# Patient Record
Sex: FEMALE | Race: WHITE | NOT HISPANIC OR LATINO | Employment: OTHER | URBAN - METROPOLITAN AREA
[De-identification: names, ages, dates, MRNs, and addresses within clinical notes are randomized per-mention and may not be internally consistent; named-entity substitution may affect disease eponyms.]

---

## 2017-08-08 ENCOUNTER — ANESTHESIA (OUTPATIENT)
Dept: SURGERY | Facility: CLINIC | Age: 72
End: 2017-08-08
Payer: MEDICARE

## 2017-08-08 ENCOUNTER — SURGERY (OUTPATIENT)
Age: 72
End: 2017-08-08

## 2017-08-08 ENCOUNTER — HOSPITAL ENCOUNTER (OUTPATIENT)
Facility: CLINIC | Age: 72
Discharge: HOME | End: 2017-08-08
Attending: PLASTIC SURGERY | Admitting: PLASTIC SURGERY
Payer: MEDICARE

## 2017-08-08 ENCOUNTER — ANESTHESIA EVENT (OUTPATIENT)
Dept: SURGERY | Facility: CLINIC | Age: 72
End: 2017-08-08
Payer: MEDICARE

## 2017-08-08 VITALS
WEIGHT: 113 LBS | DIASTOLIC BLOOD PRESSURE: 84 MMHG | BODY MASS INDEX: 22.19 KG/M2 | RESPIRATION RATE: 16 BRPM | SYSTOLIC BLOOD PRESSURE: 138 MMHG | OXYGEN SATURATION: 96 % | HEIGHT: 60 IN | TEMPERATURE: 97.2 F

## 2017-08-08 DIAGNOSIS — C50.912 MALIGNANT NEOPLASM OF LEFT FEMALE BREAST, UNSPECIFIED ESTROGEN RECEPTOR STATUS, UNSPECIFIED SITE OF BREAST (H): Primary | ICD-10-CM

## 2017-08-08 PROCEDURE — A9270 NON-COVERED ITEM OR SERVICE: HCPCS | Mod: GY | Performed by: PLASTIC SURGERY

## 2017-08-08 PROCEDURE — 27210794 ZZH OR GENERAL SUPPLY STERILE: Performed by: PLASTIC SURGERY

## 2017-08-08 PROCEDURE — L8600 IMPLANT BREAST SILICONE/EQ: HCPCS | Performed by: PLASTIC SURGERY

## 2017-08-08 PROCEDURE — 25000128 H RX IP 250 OP 636: Performed by: NURSE ANESTHETIST, CERTIFIED REGISTERED

## 2017-08-08 PROCEDURE — 88305 TISSUE EXAM BY PATHOLOGIST: CPT | Performed by: PLASTIC SURGERY

## 2017-08-08 PROCEDURE — 37000008 ZZH ANESTHESIA TECHNICAL FEE, 1ST 30 MIN: Performed by: PLASTIC SURGERY

## 2017-08-08 PROCEDURE — 36000056 ZZH SURGERY LEVEL 3 1ST 30 MIN: Performed by: PLASTIC SURGERY

## 2017-08-08 PROCEDURE — 25000132 ZZH RX MED GY IP 250 OP 250 PS 637: Mod: GY | Performed by: PLASTIC SURGERY

## 2017-08-08 PROCEDURE — 40000170 ZZH STATISTIC PRE-PROCEDURE ASSESSMENT II: Performed by: PLASTIC SURGERY

## 2017-08-08 PROCEDURE — 37000009 ZZH ANESTHESIA TECHNICAL FEE, EACH ADDTL 15 MIN: Performed by: PLASTIC SURGERY

## 2017-08-08 PROCEDURE — 36000058 ZZH SURGERY LEVEL 3 EA 15 ADDTL MIN: Performed by: PLASTIC SURGERY

## 2017-08-08 PROCEDURE — 71000013 ZZH RECOVERY PHASE 1 LEVEL 1 EA ADDTL HR: Performed by: PLASTIC SURGERY

## 2017-08-08 PROCEDURE — 25000128 H RX IP 250 OP 636: Performed by: ANESTHESIOLOGY

## 2017-08-08 PROCEDURE — 25000125 ZZHC RX 250: Performed by: NURSE ANESTHETIST, CERTIFIED REGISTERED

## 2017-08-08 PROCEDURE — 88300 SURGICAL PATH GROSS: CPT | Mod: 26 | Performed by: PLASTIC SURGERY

## 2017-08-08 PROCEDURE — 25000128 H RX IP 250 OP 636: Performed by: PLASTIC SURGERY

## 2017-08-08 PROCEDURE — 27210995 ZZH RX 272: Performed by: PLASTIC SURGERY

## 2017-08-08 PROCEDURE — 88300 SURGICAL PATH GROSS: CPT | Performed by: PLASTIC SURGERY

## 2017-08-08 PROCEDURE — 71000012 ZZH RECOVERY PHASE 1 LEVEL 1 FIRST HR: Performed by: PLASTIC SURGERY

## 2017-08-08 PROCEDURE — 88305 TISSUE EXAM BY PATHOLOGIST: CPT | Mod: 26 | Performed by: PLASTIC SURGERY

## 2017-08-08 PROCEDURE — 71000027 ZZH RECOVERY PHASE 2 EACH 15 MINS: Performed by: PLASTIC SURGERY

## 2017-08-08 PROCEDURE — 25000125 ZZHC RX 250: Performed by: PLASTIC SURGERY

## 2017-08-08 DEVICE — IMPLANTABLE DEVICE: Type: IMPLANTABLE DEVICE | Site: BREAST | Status: FUNCTIONAL

## 2017-08-08 RX ORDER — SODIUM CHLORIDE, SODIUM LACTATE, POTASSIUM CHLORIDE, CALCIUM CHLORIDE 600; 310; 30; 20 MG/100ML; MG/100ML; MG/100ML; MG/100ML
INJECTION, SOLUTION INTRAVENOUS CONTINUOUS
Status: DISCONTINUED | OUTPATIENT
Start: 2017-08-08 | End: 2017-08-08 | Stop reason: HOSPADM

## 2017-08-08 RX ORDER — ONDANSETRON 2 MG/ML
4 INJECTION INTRAMUSCULAR; INTRAVENOUS EVERY 30 MIN PRN
Status: DISCONTINUED | OUTPATIENT
Start: 2017-08-08 | End: 2017-08-08 | Stop reason: HOSPADM

## 2017-08-08 RX ORDER — ACYCLOVIR 200 MG/1
CAPSULE ORAL
Status: DISCONTINUED
Start: 2017-08-08 | End: 2017-08-08 | Stop reason: HOSPADM

## 2017-08-08 RX ORDER — CEFAZOLIN SODIUM 1 G/3ML
INJECTION, POWDER, FOR SOLUTION INTRAMUSCULAR; INTRAVENOUS
Status: DISCONTINUED
Start: 2017-08-08 | End: 2017-08-08 | Stop reason: HOSPADM

## 2017-08-08 RX ORDER — LIDOCAINE HYDROCHLORIDE 20 MG/ML
INJECTION, SOLUTION INFILTRATION; PERINEURAL PRN
Status: DISCONTINUED | OUTPATIENT
Start: 2017-08-08 | End: 2017-08-08

## 2017-08-08 RX ORDER — PROPOFOL 10 MG/ML
INJECTION, EMULSION INTRAVENOUS PRN
Status: DISCONTINUED | OUTPATIENT
Start: 2017-08-08 | End: 2017-08-08

## 2017-08-08 RX ORDER — HYDROCODONE BITARTRATE AND ACETAMINOPHEN 5; 325 MG/1; MG/1
1 TABLET ORAL
Status: COMPLETED | OUTPATIENT
Start: 2017-08-08 | End: 2017-08-08

## 2017-08-08 RX ORDER — MEPERIDINE HYDROCHLORIDE 25 MG/ML
12.5 INJECTION INTRAMUSCULAR; INTRAVENOUS; SUBCUTANEOUS
Status: DISCONTINUED | OUTPATIENT
Start: 2017-08-08 | End: 2017-08-08 | Stop reason: HOSPADM

## 2017-08-08 RX ORDER — FENTANYL CITRATE 50 UG/ML
INJECTION, SOLUTION INTRAMUSCULAR; INTRAVENOUS PRN
Status: DISCONTINUED | OUTPATIENT
Start: 2017-08-08 | End: 2017-08-08

## 2017-08-08 RX ORDER — NALOXONE HYDROCHLORIDE 0.4 MG/ML
.1-.4 INJECTION, SOLUTION INTRAMUSCULAR; INTRAVENOUS; SUBCUTANEOUS
Status: DISCONTINUED | OUTPATIENT
Start: 2017-08-08 | End: 2017-08-08 | Stop reason: HOSPADM

## 2017-08-08 RX ORDER — FENTANYL CITRATE 50 UG/ML
25-50 INJECTION, SOLUTION INTRAMUSCULAR; INTRAVENOUS
Status: DISCONTINUED | OUTPATIENT
Start: 2017-08-08 | End: 2017-08-08 | Stop reason: HOSPADM

## 2017-08-08 RX ORDER — BIOTIN 10000 MCG
CAPSULE ORAL DAILY
COMMUNITY

## 2017-08-08 RX ORDER — CEFAZOLIN SODIUM 2 G/100ML
2 INJECTION, SOLUTION INTRAVENOUS
Status: COMPLETED | OUTPATIENT
Start: 2017-08-08 | End: 2017-08-08

## 2017-08-08 RX ORDER — SODIUM CHLORIDE, SODIUM LACTATE, POTASSIUM CHLORIDE, CALCIUM CHLORIDE 600; 310; 30; 20 MG/100ML; MG/100ML; MG/100ML; MG/100ML
INJECTION, SOLUTION INTRAVENOUS CONTINUOUS PRN
Status: DISCONTINUED | OUTPATIENT
Start: 2017-08-08 | End: 2017-08-08

## 2017-08-08 RX ORDER — FENTANYL CITRATE 50 UG/ML
25-50 INJECTION, SOLUTION INTRAMUSCULAR; INTRAVENOUS EVERY 5 MIN PRN
Status: DISCONTINUED | OUTPATIENT
Start: 2017-08-08 | End: 2017-08-08 | Stop reason: HOSPADM

## 2017-08-08 RX ORDER — DEXAMETHASONE SODIUM PHOSPHATE 4 MG/ML
INJECTION, SOLUTION INTRA-ARTICULAR; INTRALESIONAL; INTRAMUSCULAR; INTRAVENOUS; SOFT TISSUE PRN
Status: DISCONTINUED | OUTPATIENT
Start: 2017-08-08 | End: 2017-08-08

## 2017-08-08 RX ORDER — VENLAFAXINE HYDROCHLORIDE 75 MG/1
CAPSULE, EXTENDED RELEASE ORAL DAILY
COMMUNITY

## 2017-08-08 RX ORDER — LIDOCAINE HYDROCHLORIDE 10 MG/ML
INJECTION, SOLUTION INFILTRATION; PERINEURAL
Status: DISCONTINUED
Start: 2017-08-08 | End: 2017-08-08 | Stop reason: HOSPADM

## 2017-08-08 RX ORDER — ONDANSETRON 2 MG/ML
INJECTION INTRAMUSCULAR; INTRAVENOUS PRN
Status: DISCONTINUED | OUTPATIENT
Start: 2017-08-08 | End: 2017-08-08

## 2017-08-08 RX ORDER — ONDANSETRON 4 MG/1
4 TABLET, ORALLY DISINTEGRATING ORAL EVERY 30 MIN PRN
Status: DISCONTINUED | OUTPATIENT
Start: 2017-08-08 | End: 2017-08-08 | Stop reason: HOSPADM

## 2017-08-08 RX ORDER — BUPIVACAINE HYDROCHLORIDE AND EPINEPHRINE 5; 5 MG/ML; UG/ML
INJECTION, SOLUTION PERINEURAL PRN
Status: DISCONTINUED | OUTPATIENT
Start: 2017-08-08 | End: 2017-08-08 | Stop reason: HOSPADM

## 2017-08-08 RX ORDER — PROPOFOL 10 MG/ML
INJECTION, EMULSION INTRAVENOUS CONTINUOUS PRN
Status: DISCONTINUED | OUTPATIENT
Start: 2017-08-08 | End: 2017-08-08

## 2017-08-08 RX ORDER — CEFAZOLIN SODIUM 1 G/3ML
1 INJECTION, POWDER, FOR SOLUTION INTRAMUSCULAR; INTRAVENOUS SEE ADMIN INSTRUCTIONS
Status: DISCONTINUED | OUTPATIENT
Start: 2017-08-08 | End: 2017-08-08 | Stop reason: HOSPADM

## 2017-08-08 RX ORDER — HYDROCODONE BITARTRATE AND ACETAMINOPHEN 5; 325 MG/1; MG/1
1-2 TABLET ORAL EVERY 4 HOURS PRN
Qty: 40 TABLET | Refills: 0 | Status: SHIPPED | OUTPATIENT
Start: 2017-08-08

## 2017-08-08 RX ORDER — EPHEDRINE SULFATE 50 MG/ML
INJECTION, SOLUTION INTRAMUSCULAR; INTRAVENOUS; SUBCUTANEOUS PRN
Status: DISCONTINUED | OUTPATIENT
Start: 2017-08-08 | End: 2017-08-08

## 2017-08-08 RX ADMIN — SODIUM CHLORIDE, POTASSIUM CHLORIDE, SODIUM LACTATE AND CALCIUM CHLORIDE: 600; 310; 30; 20 INJECTION, SOLUTION INTRAVENOUS at 12:59

## 2017-08-08 RX ADMIN — BUPIVACAINE HYDROCHLORIDE AND EPINEPHRINE BITARTRATE 20 ML: 5; .005 INJECTION, SOLUTION PERINEURAL at 11:54

## 2017-08-08 RX ADMIN — SODIUM CHLORIDE, POTASSIUM CHLORIDE, SODIUM LACTATE AND CALCIUM CHLORIDE: 600; 310; 30; 20 INJECTION, SOLUTION INTRAVENOUS at 11:24

## 2017-08-08 RX ADMIN — LIDOCAINE HYDROCHLORIDE 20 MG: 20 INJECTION, SOLUTION INFILTRATION; PERINEURAL at 11:25

## 2017-08-08 RX ADMIN — CEFAZOLIN SODIUM 2 G: 2 INJECTION, SOLUTION INTRAVENOUS at 11:30

## 2017-08-08 RX ADMIN — Medication 5 MG: at 12:31

## 2017-08-08 RX ADMIN — FENTANYL CITRATE 50 MCG: 50 INJECTION, SOLUTION INTRAMUSCULAR; INTRAVENOUS at 11:24

## 2017-08-08 RX ADMIN — CEFAZOLIN 500 ML GIVEN: 1 INJECTION, POWDER, FOR SOLUTION INTRAMUSCULAR; INTRAVENOUS at 12:29

## 2017-08-08 RX ADMIN — DEXAMETHASONE SODIUM PHOSPHATE 4 MG: 4 INJECTION, SOLUTION INTRA-ARTICULAR; INTRALESIONAL; INTRAMUSCULAR; INTRAVENOUS; SOFT TISSUE at 11:30

## 2017-08-08 RX ADMIN — PROPOFOL 160 MG: 10 INJECTION, EMULSION INTRAVENOUS at 11:25

## 2017-08-08 RX ADMIN — HYDROCODONE BITARTRATE AND ACETAMINOPHEN 1 TABLET: 5; 325 TABLET ORAL at 14:10

## 2017-08-08 RX ADMIN — FENTANYL CITRATE 25 MCG: 50 INJECTION, SOLUTION INTRAMUSCULAR; INTRAVENOUS at 12:58

## 2017-08-08 RX ADMIN — EPINEPHRINE 445 ML: 1 INJECTION INTRAMUSCULAR; INTRAVENOUS; SUBCUTANEOUS at 11:49

## 2017-08-08 RX ADMIN — ONDANSETRON 4 MG: 2 INJECTION INTRAMUSCULAR; INTRAVENOUS at 12:53

## 2017-08-08 RX ADMIN — Medication 5 MG: at 12:11

## 2017-08-08 RX ADMIN — FENTANYL CITRATE 25 MCG: 50 INJECTION, SOLUTION INTRAMUSCULAR; INTRAVENOUS at 11:50

## 2017-08-08 RX ADMIN — PHENYLEPHRINE HYDROCHLORIDE 50 MCG: 10 INJECTION, SOLUTION INTRAMUSCULAR; INTRAVENOUS; SUBCUTANEOUS at 12:37

## 2017-08-08 RX ADMIN — PROPOFOL 200 MCG/KG/MIN: 10 INJECTION, EMULSION INTRAVENOUS at 11:25

## 2017-08-08 RX ADMIN — HYDROMORPHONE HYDROCHLORIDE 0.3 MG: 1 INJECTION, SOLUTION INTRAMUSCULAR; INTRAVENOUS; SUBCUTANEOUS at 14:07

## 2017-08-08 RX ADMIN — MIDAZOLAM HYDROCHLORIDE 2 MG: 1 INJECTION, SOLUTION INTRAMUSCULAR; INTRAVENOUS at 11:23

## 2017-08-08 NOTE — IP AVS SNAPSHOT
MRN:[id number]                      After Visit Summary   8/8/2017    Marysol Kearns    MRN: [id number]           Thank you!     Thank you for choosing [facility] for your care. Our goal is always to provide you with excellent care. Hearing back from our patients is one way we can continue to improve our services. Please take a few minutes to complete the written survey that you may receive in the mail after you visit with us. Thank you!        Patient Information     Date Of Birth          1945        About your hospital stay     You were admitted on:  August 8, 2017 You last received care in the:  [facility]    You were discharged on:  August 8, 2017       Who to Call     For medical emergencies, please call 911.  For non-urgent questions about your medical care, please call your primary care provider or clinic, [phone]  For questions related to your surgery, please call your surgery clinic        Attending Provider     Provider Ken Winkler MD Plastic Surgery       Primary Care Provider Office Phone # Fax #    Luna Wick [phone]      Further instructions from your care team       Next week Wednesday w/ Dr. Willoughby.  Call [phone].  No heavy lifting or strenuous activity.  10 lb lifting limit.  Wear sports bra.  May shower and remove dressing tomorrow.      Same Day Surgery Discharge Instructions for  Sedation and General Anesthesia       It's not unusual to feel dizzy, light-headed or faint for up to 24 hours after surgery or while taking pain medication.  If you have these symptoms: sit for a few minutes before standing and have someone assist you when you get up to walk or use the bathroom.      You should rest and relax for the next 24 hours. We recommend you make arrangements to have an adult stay with you for at least 24 hours after your discharge.  Avoid hazardous and strenuous activity.      DO NOT  DRIVE any vehicle or operate mechanical equipment for 24 hours following the end of your surgery.  Even though you may feel normal, your reactions may be affected by the medication you have received.      Do not drink alcoholic beverages for 24 hours following surgery.       Slowly progress to your regular diet as you feel able. It's not unusual to feel nauseated and/or vomit after receiving anesthesia.  If you develop these symptoms, drink clear liquids (apple juice, ginger ale, broth, 7-up, etc. ) until you feel better.  If your nausea and vomiting persists for 24 hours, please notify your surgeon.        All narcotic pain medications, along with inactivity and anesthesia, can cause constipation. Drinking plenty of liquids and increasing fiber intake will help.      For any questions of a medical nature, call your surgeon.      Do not make important decisions for 24 hours.      If you had general anesthesia, you may have a sore throat for a couple of days related to the breathing tube used during surgery.  You may use Cepacol lozenges to help with this discomfort.  If it worsens or if you develop a fever, contact your surgeon.       If you feel your pain is not well managed with the pain medications prescribed by your surgeon, please contact your surgeon's office to let them know so they can address your concerns.       Discharge Instructions following Breast Surgery  [facility]    Diet:   Resume diet as tolerated.  Drink plenty of fluids to prevent constipation.    Activity:   Gentle rotation & stretching of your arms/shoulders will prevent stiffness in joints   Increase activity gradually   No heavy lifting greater than 10-15 pounds & no strenuous activity until  approved by surgeon    Bathing/Incision Care:   You may shower as directed by surgeon   Pat incisions dry.  No lotions, powders or perfumes to incisions   Tape dressings (steri strips) will fall off in 7-10 days (if  "present)    What to expect:   A tingly or itchy sensation around the incision is a normal sign of healing   Some clear, pink drainage from incisions is normal.      Notify your surgeon for the following signs & symptoms:   Redness, warmth, or swelling of the incision    Foul smelling or increased drainage   Chills or temperature greater than 101 F   Pain not controlled by pain medications      **If you have questions or concerns about your procedure,   call Dr Willoughby at [phone]**          Pending Results     No orders found from 2017 to 2017.            Admission Information     Date & Time Provider Department Dept. Phone    2017 Ken Willoughby MD [facility] [phone]      Your Vitals Were     Blood Pressure Temperature Respirations Height Weight Pulse Oximetry    151/84 96.8  F (36  C) 14 1.524 m (5') 51.3 kg (113 lb) 97%    BMI (Body Mass Index)                   22.07 kg/m2           [employer] Information     [employer] lets you send messages to your doctor, view your test results, renew your prescriptions, schedule appointments and more. To sign up, go to [email or web address] . Click on \"Log in\" on the left side of the screen, which will take you to the Welcome page. Then click on \"Sign up Now\" on the right side of the page.     You will be asked to enter the access code listed below, as well as some personal information. Please follow the directions to create your username and password.     Your access code is: [email or web address]  Expires: 2017  1:35 PM     Your access code will  in 90 days. If you need help or a new code, please call your [place] clinic or [phone].        Care EveryWhere ID     This is your Care EveryWhere ID. This could be used by other organizations to access your [place] medical records  [id number]        Equal Access to Services     ANDREW SHAW AH: John Paul Aragon, olman oakes, bandar goldman, " harvinder ramosaudrey jose'aan ah. So [facility] [phone].    ATENCIÓN: Si miguel forbes, tiene a bruce disposición servicios gratuitos de asistencia lingüística. Isael elizalde [phone].    We comply with applicable federal civil rights laws and [place] laws. We do not discriminate on the basis of race, color, national origin, age, disability sex, sexual orientation or gender identity.               Review of your medicines      START taking        Dose / Directions    HYDROcodone-acetaminophen 5-325 MG per tablet   Commonly known as:  NORCO   Used for:  Malignant neoplasm of left female breast, unspecified estrogen receptor status, unspecified site of breast (H)   Notes to Patient:  ONE TABLET TAKEN AT 2:10 P.M.        Dose:  1-2 tablet   Take 1-2 tablets by mouth every 4 hours as needed for moderate to severe pain   Quantity:  40 tablet   Refills:  0         CONTINUE these medicines which have NOT CHANGED        Dose / Directions    Biotin 10 MG Caps        Take by mouth daily   Refills:  0       EFFEXOR XR 75 MG 24 hr capsule   Generic drug:  venlafaxine        Take by mouth daily   Refills:  0       TRAZODONE HCL PO        Dose:  50 mg   Take 50 mg by mouth At Bedtime   Refills:  0       VITAMIN B 12 PO        Take by mouth daily   Refills:  0       VITAMIN D (CHOLECALCIFEROL) PO        Dose:  2000 Units   Take 2,000 Units by mouth daily   Refills:  0            Where to get your medicines      Some of these will need a paper prescription and others can be bought over the counter. Ask your nurse if you have questions.     Bring a paper prescription for each of these medications     HYDROcodone-acetaminophen 5-325 MG per tablet                Protect others around you: Learn how to safely use, store and throw away your medicines at www.disposemymeds.org.             Medication List: This is a list of all your medications and when to take them. Check marks below indicate your daily home schedule. Keep this  list as a reference.      Medications           Morning Afternoon Evening Bedtime As Needed    Biotin 10 MG Caps   Take by mouth daily                                EFFEXOR XR 75 MG 24 hr capsule   Take by mouth daily   Generic drug:  venlafaxine                                HYDROcodone-acetaminophen 5-325 MG per tablet   Commonly known as:  NORCO   Take 1-2 tablets by mouth every 4 hours as needed for moderate to severe pain   Last time this was given:  1 tablet on 8/8/2017  2:10 PM   Notes to Patient:  ONE TABLET TAKEN AT 2:10 P.M.                                TRAZODONE HCL PO   Take 50 mg by mouth At Bedtime                                VITAMIN B 12 PO   Take by mouth daily                                VITAMIN D (CHOLECALCIFEROL) PO   Take 2,000 Units by mouth daily

## 2017-08-08 NOTE — DISCHARGE INSTRUCTIONS
Next week Wednesday w/ Dr. Willoughby.  Call [phone].  No heavy lifting or strenuous activity.  10 lb lifting limit.  Wear sports bra.  May shower and remove dressing tomorrow.      Same Day Surgery Discharge Instructions for  Sedation and General Anesthesia       It's not unusual to feel dizzy, light-headed or faint for up to 24 hours after surgery or while taking pain medication.  If you have these symptoms: sit for a few minutes before standing and have someone assist you when you get up to walk or use the bathroom.      You should rest and relax for the next 24 hours. We recommend you make arrangements to have an adult stay with you for at least 24 hours after your discharge.  Avoid hazardous and strenuous activity.      DO NOT DRIVE any vehicle or operate mechanical equipment for 24 hours following the end of your surgery.  Even though you may feel normal, your reactions may be affected by the medication you have received.      Do not drink alcoholic beverages for 24 hours following surgery.       Slowly progress to your regular diet as you feel able. It's not unusual to feel nauseated and/or vomit after receiving anesthesia.  If you develop these symptoms, drink clear liquids (apple juice, ginger ale, broth, 7-up, etc. ) until you feel better.  If your nausea and vomiting persists for 24 hours, please notify your surgeon.        All narcotic pain medications, along with inactivity and anesthesia, can cause constipation. Drinking plenty of liquids and increasing fiber intake will help.      For any questions of a medical nature, call your surgeon.      Do not make important decisions for 24 hours.      If you had general anesthesia, you may have a sore throat for a couple of days related to the breathing tube used during surgery.  You may use Cepacol lozenges to help with this discomfort.  If it worsens or if you develop a fever, contact your surgeon.       If you feel your pain is not well managed with the  pain medications prescribed by your surgeon, please contact your surgeon's office to let them know so they can address your concerns.       Discharge Instructions following Breast Surgery  [facility]    Diet:   Resume diet as tolerated.  Drink plenty of fluids to prevent constipation.    Activity:   Gentle rotation & stretching of your arms/shoulders will prevent stiffness in joints   Increase activity gradually   No heavy lifting greater than 10-15 pounds & no strenuous activity until  approved by surgeon    Bathing/Incision Care:   You may shower as directed by surgeon   Pat incisions dry.  No lotions, powders or perfumes to incisions   Tape dressings (steri strips) will fall off in 7-10 days (if present)    What to expect:   A tingly or itchy sensation around the incision is a normal sign of healing   Some clear, pink drainage from incisions is normal.      Notify your surgeon for the following signs & symptoms:   Redness, warmth, or swelling of the incision    Foul smelling or increased drainage   Chills or temperature greater than 101 F   Pain not controlled by pain medications      **If you have questions or concerns about your procedure,   call Dr Willoughby at [phone]**

## 2017-08-08 NOTE — ANESTHESIA CARE TRANSFER NOTE
Patient: Marysol Kearns    Procedure(s):  RIGHT MASTOPEXY; LEFT REVISION BREAST RECONSTRUCTION WITH CAPSULOTOMY AND REMOVE AND REPLACE SILICONE GEL IMPLANT; FAT GRAFT FROM ABDOMEN. (LIPIVADANIEL AND VASER)  - Wound Class: I-Clean   - Wound Class: I-Clean   - Wound Class: I-Clean    Diagnosis: HISTORY OF LEFT MASTECTOMY   Diagnosis Additional Information: No value filed.    Anesthesia Type:   General, LMA     Note:  Airway :Face Mask  Patient transferred to:PACU  Comments: VSS. Airway and IV patent. Patient comfortable. Report to RN. Stable care transfer.      Vitals: (Last set prior to Anesthesia Care Transfer)    CRNA VITALS  8/8/2017 1235 - 8/8/2017 1309      8/8/2017             Pulse: 70    SpO2: 100 %    Resp Rate (observed): (!)  6                Electronically Signed By: LELAND Sanders CRNA  August 8, 2017  1:09 PM

## 2017-08-08 NOTE — ANESTHESIA PREPROCEDURE EVALUATION
Anesthesia Evaluation     . Pt has had prior anesthetic. Type: General    No history of anesthetic complications          ROS/MED HX    ENT/Pulmonary:  - neg pulmonary ROS     Neurologic:       Cardiovascular:  - neg cardiovascular ROS       METS/Exercise Tolerance:     Hematologic:         Musculoskeletal:         GI/Hepatic:  - neg GI/hepatic ROS       Renal/Genitourinary:         Endo:         Psychiatric:     (+) psychiatric history anxiety and depression      Infectious Disease:         Malignancy:   (+) Malignancy History of Breast  Breast CA Remission status post.         Other:                     Physical Exam  Normal systems: cardiovascular, pulmonary and dental    Airway   Mallampati: I  TM distance: >3 FB  Neck ROM: full    Dental     Cardiovascular   Rhythm and rate: regular and normal      Pulmonary    breath sounds clear to auscultation                    Anesthesia Plan      History & Physical Review  History and physical reviewed and following examination; no interval change.    ASA Status:  2 .    NPO Status:  > 8 hours    Plan for General and LMA with Propofol induction. Maintenance will be TIVA.    PONV prophylaxis:  Ondansetron (or other 5HT-3) and Dexamethasone or Solumedrol       Postoperative Care  Postoperative pain management:  IV analgesics and Oral pain medications.      Consents  Anesthetic plan, risks, benefits and alternatives discussed with:  Patient..                          .

## 2017-08-08 NOTE — OR NURSING
Monitor shows HR of 130-144s.  Pt denies SOB, Chest pain. Bp 150/80.  Dr Matta informed.  No new orders.

## 2017-08-08 NOTE — IP AVS SNAPSHOT
[facility]    [address]    [place] [address]    Phone:  [phone]    Fax:  [phone]                                       After Visit Summary   8/8/2017    Marysol Kearns    MRN: [id number]           After Visit Summary Signature Page     I have received my discharge instructions, and my questions have been answered. I have discussed any challenges I see with this plan with the nurse or doctor.    ..........................................................................................................................................  Patient/Patient Representative Signature      ..........................................................................................................................................  Patient Representative Print Name and Relationship to Patient    ..................................................               ................................................  Date                                            Time    ..........................................................................................................................................  Reviewed by Signature/Title    ...................................................              ..............................................  Date                                                            Time

## 2017-08-08 NOTE — ANESTHESIA POSTPROCEDURE EVALUATION
Patient: Marysol Kearns    Procedure(s):  RIGHT MASTOPEXY; LEFT REVISION BREAST RECONSTRUCTION WITH CAPSULOTOMY AND REMOVE AND REPLACE SILICONE GEL IMPLANT; FAT GRAFT FROM ABDOMEN. (NATHALIE AND SHEILAER)  - Wound Class: I-Clean   - Wound Class: I-Clean   - Wound Class: I-Clean    Diagnosis:HISTORY OF LEFT MASTECTOMY   Diagnosis Additional Information: No value filed.    Anesthesia Type:  General, LMA    Note:  Anesthesia Post Evaluation    Patient location during evaluation: PACU  Patient participation: Able to fully participate in evaluation  Level of consciousness: awake  Pain management: adequate  Airway patency: patent  Cardiovascular status: acceptable  Respiratory status: acceptable  Hydration status: acceptable  PONV: controlled     Anesthetic complications: None          Last vitals:  Vitals:    08/08/17 1013   BP: (!) 153/96   Resp: 16   Temp: 36.6  C (97.8  F)   SpO2: 97%         Electronically Signed By: Raoul Matta MD  August 8, 2017  1:14 PM

## 2017-08-09 LAB — COPATH REPORT: NORMAL

## 2017-08-09 NOTE — OP NOTE
DATE OF PROCEDURE:  08/08/2017      SURGEON:  Ken Willoughby MD      PREOPERATIVE DIAGNOSES:   1.  History of left breast cancer with acquired absence of left breast and history of left chest wall radiation.   2.  Disproportion of reconstructed breasts.      POSTOPERATIVE DIAGNOSES:   1.  History of left breast cancer with acquired absence of left breast and history of left chest wall radiation.   2.  Disproportion of reconstructed breasts.      PROCEDURE:   1.  Revision of left breast reconstruction with removal and replacement of silicone gel implant.   2.  Fat grafting from multiple sites of the abdomen to multiple sites of left breast.   3.  Right mastopexy.      TECHNIQUE:  Marysol Kearns was marked preoperatively.  She has a remote history of a left lumpectomy and then required a mastectomy with implant reconstruction some time after that.  These were both years ago.  She presents now with a tightened capsule around an excessively large implant on the left side with some radiation damage and very thin, soft tissue coverage.  The right side has natural ptosis despite previous mastopexy some time ago.  Our plan is to revise the left breast reconstruction removing the implant placing with a lower profile silicone device, fat grafting to improve soft tissue coverage on the left side and releasing the capsule in the lower pole.  The right side would be treated with the mastopexy.  The risks and benefits of these were discussed again and she was agreeable to proceed.  She was placed supine on the procedure table under general LMA anesthesia administered by the anesthesia department.  The breasts and abdomen were prepped and draped in a sterile fashion.  A timeout was done.  I first harvested the fat from the lower abdomen.  I made bilateral incisions in the lower abdominal area and then infiltrated with about 450 mL of a mixture of 1 liter of lactated Ringer's with 1 mL of 1:1000 epinephrine in 25 mL of 1% lidocaine.   This area was treated with the ultrasonic energy using TapMyBacker system.  This was applied at 80% energy level using a pulsing mode with a #3 ring 3.7 mm probe.  Used port site protectors and wet towels to protect the skin.  A total of 90 seconds of energy was administered.  I then harvested into the LipiVage system and processed the fat down to about 75 mL of good usable fat.  This was placed on the back table for gravity separation while I began the next portion of the reconstruction.  The harvest sites were closed with 4-0 Vicryl.  I opened the very superior lateral aspect of her left mastectomy scar.  Excess skin was extremely thin, I preserved the capsule to achieve a 2 layered closure.  I removed the implant.  It was a saline device marked 375 mL.  I performed a lower pole capsulotomy and then reset the inframammary crease a bit lower with internal sutures of 2-0 Vicryl.  I closed the lateral capsule down with 2-0 Vicryl to provide a medial shift to the pocket.  This was also done with 2-0 Vicryl.  I irrigated the wound.  I tested with a sizer and found that 320 mL moderate classic profile device would achieve the best symmetry.  I removed the sizer and placed a Glide smooth round moderate classic profile 320 mL silicone gel implant with serial [id number].  I closed the capsule with 2-0 Vicryl and the skin with 3-0 and 4-0 Vicryl.  I made a couple of the incisions medially and then used a 2 mm injection cannula to place the grafted fat into multiple areas of the upper pole of the left breast reconstruction as well as anterior area to improve projection and to hopefully improve the condition of the radiated skin.  A total of 75 mL was injected in very small amounts with each pass.  The insertion sites were closed with 5-0 fast absorbing gut.      I turned my attention to the right breast.  She had previously had a Banegas pattern mastopexy.  I made this incision again, planning on some skin resection in the lower  pole.  I redeveloped the upper skin flaps and mobilized the breast mound based on its inferomedially vascularized pedicle.  I removed just a little bit of excess bulk in the lateral portion of the right breast and this was sent to pathology for routine examination.  I mobilized the breast mound and brought this superiorly and affixed to the chest wall in multiple locations with 2-0 Vicryl internally.  The skin was closed in an inverted T fashion with 3-0 and 4-0 Vicryl including the nipple areolar complex.  A nice symmetry was seen with a slight overcorrection of a lift as planned.  Sterile dressings were applied.  Anesthesia was reversed and the patient was taken to the recovery room in satisfactory condition.         ARCENIO SHAW MD             D: 2017 17:11   T: 2017 22:32   MT: [id number]      Name:     CARL DAVIDSON   MRN:      [id number]        Account:        [id number]   :      1945           Procedure Date: 2017      Document: [id number]

## 2022-04-28 ENCOUNTER — OFFICE VISIT (OUTPATIENT)
Dept: VASCULAR SURGERY | Facility: CLINIC | Age: 77
End: 2022-04-28
Payer: MEDICARE

## 2022-04-28 DIAGNOSIS — I83.93 ASYMPTOMATIC VARICOSE VEINS OF BILATERAL LOWER EXTREMITIES: Primary | ICD-10-CM

## 2022-04-28 PROCEDURE — 99202 OFFICE O/P NEW SF 15 MIN: CPT | Performed by: SURGERY

## 2022-04-28 RX ORDER — ATORVASTATIN CALCIUM 10 MG/1
10 TABLET, FILM COATED ORAL DAILY
COMMUNITY
Start: 2022-03-06

## 2022-04-28 NOTE — PROGRESS NOTES
VE[facility]UTIONS CONSULTATION    HPI:    Marysol Kearns is a pleasant 76 year old self-referred female who presents with concerns primarily of the cosmetic concern of scattered lower extremity telangiectasias and reticular veins.  She denies history of deep vein thrombosis, superficial thrombophlebitis or hemorrhage.  She denies significant swelling of the legs.    Her family history is negative for varicose veins to her knowledge.  She is an only child, therefore does not have any siblings with varicose veins.    PAST MEDICAL HISTORY:   Past Medical History:   Diagnosis Date     Breast cancer (H)      Disorder of bone and cartilage      LIV (generalized anxiety disorder)      History of shingles      Major depressive disorder, recurrent episode, in full remission (H)      Osteopenia      Shoulder pain      Status post chemotherapy      Syringomyelia and syringobulbia (H)      Syrinx of spinal cord (H)        PAST SURGICAL HISTORY:   Past Surgical History:   Procedure Laterality Date     ABDOMEN SURGERY      PYLORIC STENOSIS REPAIR (IN INFANCY)     COLONOSCOPY      WITH POLYPECTOMY     GRAFT FAT TO BREAST N/A 8/8/2017    Procedure: GRAFT FAT TO BREAST;;  Surgeon: Ken Wilolughby MD;  Location: [facility]     GYN SURGERY      BILATEARL OOPHORECTOMY     LUMPECTOMY BREAST      LEFT BREAST RECONSTRUCTION     MASTECTOMY Left      MASTOPEXY Right 8/8/2017    Procedure: MASTOPEXY;  RIGHT MASTOPEXY; LEFT REVISION BREAST RECONSTRUCTION WITH CAPSULOTOMY AND REMOVE AND REPLACE SILICONE GEL IMPLANT; FAT GRAFT FROM ABDOMEN. (LIPIVAGE AND VASER) ;  Surgeon: Ken Willoughby MD;  Location: [facility]     REMOVE AND REPLACE BREAST IMPLANT PROSTHESIS Left 8/8/2017    Procedure: REMOVE AND REPLACE BREAST IMPLANT PROSTHESIS;;  Surgeon: Ken Willoughby MD;  Location: [facility]       FAMILY HISTORY: No family history on file.    SOCIAL HISTORY:   Social History     Tobacco Use     Smoking status: Never Smoker     Smokeless  tobacco: Never Used   Substance Use Topics     Alcohol use: No       REVIEW OF SYSTEMS: Review Of Systems  Skin: negative  Eyes: negative  Ears/Nose/Throat: negative  Respiratory: No shortness of breath, dyspnea on exertion, cough, or hemoptysis  Cardiovascular: negative  Gastrointestinal: negative  Genitourinary: negative  Musculoskeletal: negative  Neurologic: negative  Psychiatric: negative  Hematologic/Lymphatic/Immunologic: negative  Endocrine: negative      Vital signs:  There were no vitals taken for this visit.    Current Outpatient Medications   Medication Sig Dispense Refill     Biotin 10 MG CAPS Take by mouth daily       Cyanocobalamin (VITAMIN B 12 PO) Take by mouth daily       TRAZODONE HCL PO Take 50 mg by mouth At Bedtime       venlafaxine (EFFEXOR-XR) 75 MG 24 hr capsule Take by mouth daily       VITAMIN D, CHOLECALCIFEROL, PO Take 2,000 Units by mouth daily       atorvastatin (LIPITOR) 10 MG tablet Take 10 mg by mouth daily       HYDROcodone-acetaminophen (NORCO) 5-325 MG per tablet Take 1-2 tablets by mouth every 4 hours as needed for moderate to severe pain (Patient not taking: Reported on 4/28/2022) 40 tablet 0       PHYSICAL EXAM:  General: Pleasant, NAD.   HEENT: Normocephalic, atraumatic, external ears and nose normal.   Respiratory: Normal respiratory effort.   Cardiovascular: Pulse is regular.   Musculoskeletal: Gait and station normal.  The joints of her fingers and toes without deformity.  There is no cyanosis of her nailbeds.   EXTREMITIES: She has scattered reticular veins on the lateral and posterior thighs extending across the popliteal fossas with some scattered telangiectasias over the lateral thighs and legs.  No significant varicose veins, stasis changes or edema.  PULSES: R/L (3=normal pulse, 0=no palpable pulse) dorsalis pedis: 3/3; posterior tibial: 2/2.      Neurologic: Grossly normal  Psychiatric: Mood, affect, judgment and insight are normal     ASSESSMENT:  Bilateral  extremity reticular veins and scattered telangiectasias of cosmetic concern.  We discussed the anatomy of the lower extremity veins, the pathophysiology of venous insufficiency and the option of continued conservative management with use of compression hose, leg elevation, dietary measures and exercise.  Her symptoms do not seem to be significant enough to warrant imaging.    She may be interested in having treatment for cosmetic purposes.  Details of cosmetic sclerotherapy including risks of allergic reaction, ulceration, hyperpigmentation and superficial phlebitis were discussed.  She voiced understanding and her questions were answered.  She understands fully that sclerotherapy will not become restaurants will be her responsibility.    PLAN:  Possible bilateral extremity cosmetic sclerotherapy     Virgil Pop MD    Dictated using Dragon voice recognition software which may result in transcription errors

## 2022-04-28 NOTE — LETTER
4/28/2022         RE: Marysol Kearns  [address] [facility] [address]        Dear Colleague,    Thank you for referring your patient, Marysol Kearns, to the [facility] [place]. Please see a copy of my visit note below.    [facility]OLUTIONS CONSULTATION    HPI:    Marysol Kearns is a pleasant 76 year old self-referred female who presents with concerns primarily of the cosmetic concern of scattered lower extremity telangiectasias and reticular veins.  She denies history of deep vein thrombosis, superficial thrombophlebitis or hemorrhage.  She denies significant swelling of the legs.    Her family history is negative for varicose veins to her knowledge.  She is an only child, therefore does not have any siblings with varicose veins.    PAST MEDICAL HISTORY:   Past Medical History:   Diagnosis Date     Breast cancer (H)      Disorder of bone and cartilage      LIV (generalized anxiety disorder)      History of shingles      Major depressive disorder, recurrent episode, in full remission (H)      Osteopenia      Shoulder pain      Status post chemotherapy      Syringomyelia and syringobulbia (H)      Syrinx of spinal cord (H)        PAST SURGICAL HISTORY:   Past Surgical History:   Procedure Laterality Date     ABDOMEN SURGERY      PYLORIC STENOSIS REPAIR (IN INFANCY)     COLONOSCOPY      WITH POLYPECTOMY     GRAFT FAT TO BREAST N/A 8/8/2017    Procedure: GRAFT FAT TO BREAST;;  Surgeon: Ken Willoughby MD;  Location: [facility]     GYN SURGERY      BILATEARL OOPHORECTOMY     LUMPECTOMY BREAST      LEFT BREAST RECONSTRUCTION     MASTECTOMY Left      MASTOPEXY Right 8/8/2017    Procedure: MASTOPEXY;  RIGHT MASTOPEXY; LEFT REVISION BREAST RECONSTRUCTION WITH CAPSULOTOMY AND REMOVE AND REPLACE SILICONE GEL IMPLANT; FAT GRAFT FROM ABDOMEN. (LIPIVAGE AND VASER) ;  Surgeon: Ken Willoughby MD;  Location: [facility]     REMOVE AND REPLACE BREAST IMPLANT PROSTHESIS Left 8/8/2017    Procedure:  REMOVE AND REPLACE BREAST IMPLANT PROSTHESIS;;  Surgeon: Ken Willoughby MD;  Location: [facility]       FAMILY HISTORY: No family history on file.    SOCIAL HISTORY:   Social History     Tobacco Use     Smoking status: Never Smoker     Smokeless tobacco: Never Used   Substance Use Topics     Alcohol use: No       REVIEW OF SYSTEMS: Review Of Systems  Skin: negative  Eyes: negative  Ears/Nose/Throat: negative  Respiratory: No shortness of breath, dyspnea on exertion, cough, or hemoptysis  Cardiovascular: negative  Gastrointestinal: negative  Genitourinary: negative  Musculoskeletal: negative  Neurologic: negative  Psychiatric: negative  Hematologic/Lymphatic/Immunologic: negative  Endocrine: negative      Vital signs:  There were no vitals taken for this visit.    Current Outpatient Medications   Medication Sig Dispense Refill     Biotin 10 MG CAPS Take by mouth daily       Cyanocobalamin (VITAMIN B 12 PO) Take by mouth daily       TRAZODONE HCL PO Take 50 mg by mouth At Bedtime       venlafaxine (EFFEXOR-XR) 75 MG 24 hr capsule Take by mouth daily       VITAMIN D, CHOLECALCIFEROL, PO Take 2,000 Units by mouth daily       atorvastatin (LIPITOR) 10 MG tablet Take 10 mg by mouth daily       HYDROcodone-acetaminophen (NORCO) 5-325 MG per tablet Take 1-2 tablets by mouth every 4 hours as needed for moderate to severe pain (Patient not taking: Reported on 4/28/2022) 40 tablet 0       PHYSICAL EXAM:  General: Pleasant, NAD.   HEENT: Normocephalic, atraumatic, external ears and nose normal.   Respiratory: Normal respiratory effort.   Cardiovascular: Pulse is regular.   Musculoskeletal: Gait and station normal.  The joints of her fingers and toes without deformity.  There is no cyanosis of her nailbeds.   EXTREMITIES: She has scattered reticular veins on the lateral and posterior thighs extending across the popliteal fossas with some scattered telangiectasias over the lateral thighs and legs.  No significant varicose  veins, stasis changes or edema.  PULSES: R/L (3=normal pulse, 0=no palpable pulse) dorsalis pedis: 3/3; posterior tibial: 2/2.      Neurologic: Grossly normal  Psychiatric: Mood, affect, judgment and insight are normal     ASSESSMENT:  Bilateral extremity reticular veins and scattered telangiectasias of cosmetic concern.  We discussed the anatomy of the lower extremity veins, the pathophysiology of venous insufficiency and the option of continued conservative management with use of compression hose, leg elevation, dietary measures and exercise.  Her symptoms do not seem to be significant enough to warrant imaging.    She may be interested in having treatment for cosmetic purposes.  Details of cosmetic sclerotherapy including risks of allergic reaction, ulceration, hyperpigmentation and superficial phlebitis were discussed.  She voiced understanding and her questions were answered.  She understands fully that sclerotherapy will not become restaurants will be her responsibility.    PLAN:  Possible bilateral extremity cosmetic sclerotherapy     Virgil Pop MD    Dictated using Dragon voice recognition software which may result in transcription errors                  [facility] LEG DRAWING:                  Again, thank you for allowing me to participate in the care of your patient.        Sincerely,        Virgil Pop MD

## 2022-04-28 NOTE — NURSING NOTE
Patient Reported symptoms:    Right leg   Heaviness None of the time   Achiness None of the time   Swelling None of the time   Throbbing None of the time   Itching None of the time   Appearance Very noticeable   Impact on work/activities Symptoms but full able to participate    Left Leg   Heaviness None of the time   Achiness None of the time   Swelling None of the time   Throbbing None of the time   Itching None of the time   Appearance Very noticeable   Impact on work/activities Symptoms but full able to participate

## (undated) DEVICE — TUBING SUCTION LIPECTOMY

## (undated) DEVICE — BLADE KNIFE SURG 15 371115

## (undated) DEVICE — PACK MAJOR SBA15MAFSI

## (undated) DEVICE — BNDG ABDOMINAL BINDER 9X30-45" 79-89070

## (undated) DEVICE — SOL RINGERS LACTATED 1000ML BAG 2B2324X

## (undated) DEVICE — NDL SPINAL 22GA 3.5" QUINCKE 405181

## (undated) DEVICE — SYR 10ML FINGER CONTROL W/O NDL 309695

## (undated) DEVICE — SU VICRYL 4-0 PS-2 18" UND J496H

## (undated) DEVICE — SOL NACL 0.9% IRRIG 1000ML BOTTLE 07138-09

## (undated) DEVICE — TUBING INFUSION INFILTRATION LIPOSUCTION 156" 24-6008

## (undated) DEVICE — TUBING IV EXTENSION SET ANESTHESIA 34" MLL 2C6227

## (undated) DEVICE — DRSG KERLIX 4 1/2"X4YDS ROLL 6715

## (undated) DEVICE — SYR 50ML CATH TIP W/O NDL 309620

## (undated) DEVICE — Device

## (undated) DEVICE — PAD CHUX UNDERPAD 23X24" 7136

## (undated) DEVICE — SU VICRYL 2-0 CT-1 27" UND J259H

## (undated) DEVICE — DRAPE BREAST/CHEST 29420

## (undated) DEVICE — SU VICRYL 3-0 PS-1 18" UND J683

## (undated) DEVICE — PREP SKIN SCRUB TRAY 4461A

## (undated) DEVICE — DRSG STERI STRIP 1/2X4" R1547

## (undated) DEVICE — SUCTION CANISTER MEDIVAC LINER 3000ML W/LID 65651-530

## (undated) DEVICE — BNDG ELASTIC 6" DBL LENGTH UNSTERILE 6611-16

## (undated) DEVICE — NDL 19GA 1.5"

## (undated) DEVICE — SU PLAIN FAST ABSORB 5-0 PC-1 18" 1915G

## (undated) DEVICE — GLOVE PROTEXIS W/NEU-THERA 7.5  2D73TE75

## (undated) DEVICE — SYR 10ML SLIP TIP W/O NDL

## (undated) DEVICE — SYR 10ML LL W/O NDL

## (undated) DEVICE — LINEN TOWEL PACK X5 5464

## (undated) RX ORDER — FENTANYL CITRATE 50 UG/ML
INJECTION, SOLUTION INTRAMUSCULAR; INTRAVENOUS
Status: DISPENSED
Start: 2017-08-08

## (undated) RX ORDER — HYDROCODONE BITARTRATE AND ACETAMINOPHEN 5; 325 MG/1; MG/1
TABLET ORAL
Status: DISPENSED
Start: 2017-08-08

## (undated) RX ORDER — CEFAZOLIN SODIUM 2 G/100ML
INJECTION, SOLUTION INTRAVENOUS
Status: DISPENSED
Start: 2017-08-08

## (undated) RX ORDER — PROPOFOL 10 MG/ML
INJECTION, EMULSION INTRAVENOUS
Status: DISPENSED
Start: 2017-08-08

## (undated) RX ORDER — DEXAMETHASONE SODIUM PHOSPHATE 4 MG/ML
INJECTION, SOLUTION INTRA-ARTICULAR; INTRALESIONAL; INTRAMUSCULAR; INTRAVENOUS; SOFT TISSUE
Status: DISPENSED
Start: 2017-08-08

## (undated) RX ORDER — HYDROMORPHONE HYDROCHLORIDE 1 MG/ML
INJECTION, SOLUTION INTRAMUSCULAR; INTRAVENOUS; SUBCUTANEOUS
Status: DISPENSED
Start: 2017-08-08